# Patient Record
Sex: FEMALE | Race: OTHER | Employment: UNEMPLOYED | URBAN - NONMETROPOLITAN AREA
[De-identification: names, ages, dates, MRNs, and addresses within clinical notes are randomized per-mention and may not be internally consistent; named-entity substitution may affect disease eponyms.]

---

## 2018-10-18 PROBLEM — R10.9 ABDOMINAL CRAMPING: Status: ACTIVE | Noted: 2018-10-18

## 2018-11-05 ENCOUNTER — PREP FOR PROCEDURE (OUTPATIENT)
Dept: OBGYN | Age: 38
End: 2018-11-05

## 2018-11-05 PROBLEM — O32.0XX9: Status: ACTIVE | Noted: 2018-11-05

## 2018-11-05 RX ORDER — ONDANSETRON 2 MG/ML
4 INJECTION INTRAMUSCULAR; INTRAVENOUS EVERY 6 HOURS PRN
Status: CANCELLED | OUTPATIENT
Start: 2018-11-05

## 2018-11-05 RX ORDER — SODIUM CHLORIDE 0.9 % (FLUSH) 0.9 %
10 SYRINGE (ML) INJECTION EVERY 12 HOURS SCHEDULED
Status: CANCELLED | OUTPATIENT
Start: 2018-11-05

## 2018-11-05 RX ORDER — MISOPROSTOL 100 UG/1
900 TABLET ORAL PRN
Status: CANCELLED | OUTPATIENT
Start: 2018-11-05

## 2018-11-05 RX ORDER — SODIUM CHLORIDE 0.9 % (FLUSH) 0.9 %
10 SYRINGE (ML) INJECTION PRN
Status: CANCELLED | OUTPATIENT
Start: 2018-11-05

## 2018-11-05 RX ORDER — LIDOCAINE HYDROCHLORIDE 10 MG/ML
30 INJECTION, SOLUTION EPIDURAL; INFILTRATION; INTRACAUDAL; PERINEURAL PRN
Status: CANCELLED | OUTPATIENT
Start: 2018-11-05

## 2018-11-05 RX ORDER — SEVOFLURANE 250 ML/250ML
1 LIQUID RESPIRATORY (INHALATION) CONTINUOUS PRN
Status: CANCELLED | OUTPATIENT
Start: 2018-11-05

## 2018-11-05 RX ORDER — CARBOPROST TROMETHAMINE 250 UG/ML
250 INJECTION, SOLUTION INTRAMUSCULAR PRN
Status: CANCELLED | OUTPATIENT
Start: 2018-11-05

## 2018-11-05 RX ORDER — ACETAMINOPHEN 325 MG/1
650 TABLET ORAL EVERY 4 HOURS PRN
Status: CANCELLED | OUTPATIENT
Start: 2018-11-05

## 2018-11-05 RX ORDER — NALBUPHINE HYDROCHLORIDE 10 MG/ML
10 INJECTION, SOLUTION INTRAMUSCULAR; INTRAVENOUS; SUBCUTANEOUS
Status: CANCELLED | OUTPATIENT
Start: 2018-11-05

## 2018-11-05 RX ORDER — METHYLERGONOVINE MALEATE 0.2 MG/ML
200 INJECTION INTRAVENOUS PRN
Status: CANCELLED | OUTPATIENT
Start: 2018-11-05

## 2018-11-05 RX ORDER — SODIUM CHLORIDE, SODIUM LACTATE, POTASSIUM CHLORIDE, CALCIUM CHLORIDE 600; 310; 30; 20 MG/100ML; MG/100ML; MG/100ML; MG/100ML
INJECTION, SOLUTION INTRAVENOUS CONTINUOUS
Status: CANCELLED | OUTPATIENT
Start: 2018-11-05

## 2020-08-12 PROBLEM — Z34.90 TERM PREGNANCY: Status: RESOLVED | Noted: 2018-11-05 | Resolved: 2020-08-12

## 2022-01-03 PROBLEM — Z34.90 ENCOUNTER FOR ELECTIVE INDUCTION OF LABOR: Status: ACTIVE | Noted: 2022-01-03

## 2022-01-03 PROBLEM — Z34.90 TERM PREGNANCY: Status: ACTIVE | Noted: 2022-01-03

## 2022-01-04 ENCOUNTER — ROUTINE PRENATAL (OUTPATIENT)
Dept: OBGYN | Age: 42
End: 2022-01-04
Payer: MEDICARE

## 2022-01-04 DIAGNOSIS — Z36.89 DETERMINE FETAL PRESENTATION USING ULTRASOUND: ICD-10-CM

## 2022-01-04 DIAGNOSIS — Z3A.39 39 WEEKS GESTATION OF PREGNANCY: ICD-10-CM

## 2022-01-04 PROCEDURE — 76815 OB US LIMITED FETUS(S): CPT | Performed by: OBSTETRICS & GYNECOLOGY

## 2023-05-01 PROBLEM — Z34.90 ENCOUNTER FOR ELECTIVE INDUCTION OF LABOR: Status: RESOLVED | Noted: 2022-01-03 | Resolved: 2023-05-01

## 2023-05-01 PROBLEM — Z34.90 TERM PREGNANCY: Status: RESOLVED | Noted: 2022-01-03 | Resolved: 2023-05-01

## 2023-05-01 PROBLEM — O40.3XX0 POLYHYDRAMNIOS AFFECTING PREGNANCY IN THIRD TRIMESTER: Status: RESOLVED | Noted: 2018-11-05 | Resolved: 2023-05-01

## 2023-05-01 PROBLEM — O32.0XX9: Status: RESOLVED | Noted: 2018-11-05 | Resolved: 2023-05-01

## 2023-05-01 PROBLEM — R10.9 ABDOMINAL CRAMPING: Status: RESOLVED | Noted: 2018-10-18 | Resolved: 2023-05-01

## 2023-05-01 PROBLEM — Z3A.39 39 WEEKS GESTATION OF PREGNANCY: Status: RESOLVED | Noted: 2022-01-03 | Resolved: 2023-05-01

## 2023-08-10 ENCOUNTER — HOSPITAL ENCOUNTER (OUTPATIENT)
Dept: PHYSICAL THERAPY | Age: 43
Setting detail: THERAPIES SERIES
Discharge: HOME | End: 2023-08-10
Payer: COMMERCIAL

## 2023-08-10 PROCEDURE — 97110 THERAPEUTIC EXERCISES: CPT

## 2023-08-10 PROCEDURE — G0283 ELEC STIM OTHER THAN WOUND: HCPCS

## 2023-08-10 PROCEDURE — 97161 PT EVAL LOW COMPLEX 20 MIN: CPT

## 2023-08-10 ASSESSMENT — PAIN SCALES - QUEBEC BACK PAIN DISABILITY SCALE
RUN ONE BLOCK OR 100M: 4
TAKE FOOD OUT OF THE REFRIGERATOR: 1
PULL OR PUSH HEAVY DOORS: 3
GET OUT OF BED: 1
STAND UP FOR 20 TO 30 MINUTES: 2
MOVE A CHAIR: 2
TURN OVER IN BED: 1
BEND OVER TO CLEAN THE BATHTUB: 3
CARRY TWO BAGS OF GROCERIES: 1
WALK A FEW BLOCKS OR 300 TO 400M: 2
THROW A BALL: 3
REACH UP TO HIGH SHELVES: 2
WALK SEVERAL KILOMETERS  OR MILES: 3
LIFT AND CARRY A HEAVY SUITCASE: 4
RIDE IN A CAR: 3
SIT IN A CHAIR FOR SEVERAL HOURS: 3
PUT ON SOCKS OR PANYHOSE: 1
TOTAL SCORE: 47
MAKE YOUR BED: 2
SLEEP THROUGH THE NIGHT: 4
CLIMB ONE FLIGHT OF STAIRS: 2

## 2023-08-10 NOTE — PROGRESS NOTES
Phone: [address]          Fax: [phone]                      Outpatient Physical Therapy                                                                      Evaluation  Date: 8/10/2023  Patient: Wolf Mosley  : 1980  [facility] #: [id number]    Referring Physician: BONNIE Harrington - *    Medical Diagnosis: Paraspinal muscle spasm, M62.830    Treatment Diagnosis: Low back pain, SIJ dysfunction  Onset Date: 23  PT Insurance Information: [place]  Total # of Visits Approved: 13   Total # of Visits to Date: 1  No Show: 0  Canceled Appointment: 0     Subjective  Subjective: Pt reports onset of back pain in 2023 since multiple pregnancies and prolonged travel and stress. Current pain is 2/10 that is worse when trying to sleep at night. Pain medication helps a little bit but makes her sleepy. 8/10 pain at worst with certain movements with sharp shooting pain and spasms. No xrays taken. Additional Pertinent Hx: hypoglycemia    Observations:   General Observations  Description: Pt sits with lumbar hyperlordosis and anterior pelvic tilt with increased tone/spasm in bilateral paraspinals. (-) B slump, SLR, (+) R JORGE, L innominate anterior rotation corrected with MET in supine.     Objective    Strength       Strength LLE  L Hip Flexion: 4-/5  L Hip ABduction: 4-/5  L Hip ADduction: 4/5  L Knee Flexion: 4-/5  L Knee Extension: 4/5  L Ankle Dorsiflexion: 4/5       Lumbar Assessment     AROM Lumbar Spine   Lumbar spine general AROM: flexion: to floor with pain, B SB: 2in past knees with pain     Special Tests:   Strength RLE  R Hip Flexion: 4-/5  R Hip ABduction: 4-/5  R Hip ADduction: 4/5  R Knee Flexion: 4-/5  R Knee Extension: 4/5  R Ankle Dorsiflexion: 4/5  Strength LLE  L Hip Flexion: 4-/5  L Hip ABduction: 4-/5  L Hip ADduction: 4/5  L Knee Flexion: 4-/5  L Knee Extension: 4/5  L Ankle Dorsiflexion: 4/5      Exercises:  Exercise 1: HEP: PPT, glut

## 2023-08-10 NOTE — PLAN OF CARE
[facility]           Phone: [phone]             Outpatient Physical Therapy  Fax: [phone]                                           Date: 8/10/2023  Patient: Yaneth Epstein : 1980 [facility] #: [id number]   Referring Physician: Berna Frey APRN - *      [x] Plan of Care   [] Updated Plan of Care    Dates of Service to Include: 8/10/2023 to 23    Diagnosis:  Paraspinal muscle spasm, M62.830    Rehab (Treatment) Diagnosis:  Low back pain, SIJ dysfunction             Onset Date:  23    Attendance  Total # of Visits to Date: 1 No Show: 0 Canceled Appointment: 0    Assessment  Assessment: Patient is 44year old female with dx of paraspinal spasms who presents with increased pain 3/54 at worst with certain movements. Pt sits with lumbar hyperlordosis and anterior pelvic tilt with increased tone/spasm in bilateral paraspinals. (-) B slump, SLR, (+) R JORGE, L innominate anterior rotation corrected with MET in supine. Patient with decreased core strength: 4-/5 grossly and decreased B hip strength: 4-/5 all planes. Patient with lumbar ROM WNL's but with increased pain. Patient to benefit from physical therapy to decrease pain and to improve core strength and lumbar stability to return to PLOF. Goals  Short Term Goals  Time Frame for Short Term Goals: 3 weeks  Short Term Goal 1: Patient to initiate HEP for improved core and B hip strength for improved lumbar stability. Short Term Goal 2: Patient to be re-assessed for pelvic alignment and corrected with MET as needed to decrease pain. Short Term Goal 3: Initiate manual techniques/modalities prn to decrease pain and improve mobility. Long Term Goals  Time Frame for Long Term Goals : 6 weeks  Long Term Goal 1: Patient to  be independent and compliant with HEP.   Long Term Goal 2: Patient to have improved core and B hip strength >/=5/5 grossly for

## 2023-08-14 ENCOUNTER — HOSPITAL ENCOUNTER (OUTPATIENT)
Dept: PHYSICAL THERAPY | Age: 43
Setting detail: THERAPIES SERIES
Discharge: HOME | End: 2023-08-14
Payer: COMMERCIAL

## 2023-08-14 PROCEDURE — G0283 ELEC STIM OTHER THAN WOUND: HCPCS

## 2023-08-14 PROCEDURE — 97140 MANUAL THERAPY 1/> REGIONS: CPT

## 2023-08-14 PROCEDURE — 97110 THERAPEUTIC EXERCISES: CPT

## 2023-08-14 NOTE — PROGRESS NOTES
Phone: [address] [facility]           Fax: [phone]                           Outpatient Physical Therapy                                                                            Daily Note    Patient: Shreyas Park : 1980  [facility] #: [id number]   Referring Physician: BONNIE Mckeon - *  Date: 2023     Treatment Diagnosis: Low back pain, SIJ dysfunction    Onset Date: 23  PT Insurance Information: [place]  Total # of Visits Approved: 13 Per Physician Order  Total # of Visits to Date: 2  No Show: 0  Canceled Appointment: 0    23 Plan of Care/Recert Due    Pre-Treatment Pain:  2/10  Subjective: Pt states she has still been pretty tight and shes having some soreness. Pt rates current pain a /10. Exercises:  Exercise 1: HEP: PPT, glut sets, LTR, DKTC  Exercise 2: SKTC 3x30  Exercise 3: PPT/ PPT march/  15x ea  Exercise 4: supine clams 15x (GTB)    Manual:  Muscle Energy: MET to correct L innominate anterior rotation  Soft Tissue Mobilizaton: Thermoprobe hot 8 mins (supine) to lumbar spine    Modalities:   IFC with HP 15 mins to LB for discomfort. Assessment  Assessment: Initiated light core stability program today with fair tolerance noted. Pt displayed weak core with difficulty maintaining pelvic stability during exercises. Thermoprobe to lumbar paraspinals for discomfort. Will continue. Activity Tolerance  Activity Tolerance: Patient tolerated treatment well, Patient limited by pain    Patient Education  Patient Education: Exercise rationale. Pt verbalized/demonstrated good understanding:     [x] Yes         [] No, pt required further clarification.      Post Treatment Pain:  2/10    Plan  Plan Frequency: 2  Plan weeks: 6     Goals  (Total # of Visits to Date: 2)      Short Term Goals  Time Frame for Short Term Goals: 3 weeks  Short Term Goal 1: Patient to initiate HEP for improved core and B hip strength for improved lumbar

## 2023-08-16 ENCOUNTER — HOSPITAL ENCOUNTER (OUTPATIENT)
Dept: PHYSICAL THERAPY | Age: 43
Setting detail: THERAPIES SERIES
Discharge: HOME | End: 2023-08-16
Payer: COMMERCIAL

## 2023-08-16 PROCEDURE — 97140 MANUAL THERAPY 1/> REGIONS: CPT

## 2023-08-16 PROCEDURE — 97110 THERAPEUTIC EXERCISES: CPT

## 2023-08-16 NOTE — PROGRESS NOTES
Phone: [address]           Fax: [phone]                           Outpatient Physical Therapy                                                                            Daily Note    Patient: Arianne Neff : 1980  [facility] #: [id number]   Referring Physician: Phil  - *  Date: 2023       Treatment Diagnosis: Low back pain, SIJ dysfunction    Onset Date: 23  PT Insurance Information: [place]  Total # of Visits Approved: 13 Per Physician Order  Total # of Visits to Date: 3  No Show: 0  Canceled Appointment: 0    23 Plan of Care/Recert Due    Pre-Treatment Pain:  1/10  Subjective: pt reports being sore after therapy for a day then it calms down, pt feels therapy is helping reduce pain and tightness. Exercises:  Exercise 1: HEP: PPT, glut sets, LTR, DKTC  Exercise 2: SKTC 3x30  Exercise 3: PPT/ PPT march/  15x ea  Exercise 4: supine clams 15x (GTB) ; hip add with ball 15x  Exercise 5: Scifit 10min level 2  Exercise 6: standing sink ex x20    Manual:  Muscle Energy: MET to correct L innominate anterior rotation  Soft Tissue Mobilizaton: Thermoprobe hot 8 mins (supine) to lumbar spine    Assessment  Assessment: progressed pt with core and hip strengthening with fair tolerance. pt reported feeling \"out of shape\" after each exercise. thermaprobe provided to reduce muscle tone and discomfort. continue to progress per tolerance. PTA encouraged pt to continue HEP to aid in return to PLOF. Activity Tolerance  Activity Tolerance: Patient tolerated treatment well    Patient Education  Patient Education: Exercise rationale. Pt verbalized/demonstrated good understanding:     [x] Yes         [] No, pt required further clarification.        Post Treatment Pain:  1/10      Plan  Plan Frequency: 2  Plan weeks: 6       Goals  (Total # of Visits to Date: 3)      Short Term Goals  Time Frame for Short Term Goals: 3 weeks  Short Term

## 2023-08-21 ENCOUNTER — HOSPITAL ENCOUNTER (OUTPATIENT)
Dept: PHYSICAL THERAPY | Age: 43
Setting detail: THERAPIES SERIES
Discharge: HOME | End: 2023-08-21
Payer: COMMERCIAL

## 2023-08-21 PROCEDURE — G0283 ELEC STIM OTHER THAN WOUND: HCPCS

## 2023-08-21 PROCEDURE — 97140 MANUAL THERAPY 1/> REGIONS: CPT

## 2023-08-21 PROCEDURE — 97110 THERAPEUTIC EXERCISES: CPT

## 2023-08-21 NOTE — PROGRESS NOTES
Short Term Goals  Time Frame for Short Term Goals: 3 weeks  Short Term Goal 1: Patient to initiate HEP for improved core and B hip strength for improved lumbar stability. -MET  Short Term Goal 2: Patient to be re-assessed for pelvic alignment and corrected with MET as needed to decrease pain. -MET/ Continued  Short Term Goal 3: Initiate manual techniques/modalities prn to decrease pain and improve mobility. -MET    Long Term Goals  Time Frame for Long Term Goals : 6 weeks  Long Term Goal 1: Patient to  be independent and compliant with HEP. Long Term Goal 2: Patient to have improved core and B hip strength >/=5/5 grossly for improved lumbar stability. Long Term Goal 3: Patient to have improved lumbar AROM flexion to floor and B SB to knees with no increase in pain for improved mobility. Long Term Goal 4: Patient to report >/=75% improvement in symptoms for improved QOL.   Minutes Tracking:  Time In: 0932  Time Out: 1030  Minutes: 58  Timed Code Treatment Minutes: [place], [place]     Date: 8/21/2023

## 2023-08-23 ENCOUNTER — HOSPITAL ENCOUNTER (OUTPATIENT)
Dept: PHYSICAL THERAPY | Age: 43
Setting detail: THERAPIES SERIES
Discharge: HOME | End: 2023-08-23
Payer: COMMERCIAL

## 2023-08-23 ENCOUNTER — APPOINTMENT (OUTPATIENT)
Dept: PHYSICAL THERAPY | Age: 43
End: 2023-08-23
Payer: COMMERCIAL

## 2023-08-23 PROCEDURE — 97110 THERAPEUTIC EXERCISES: CPT

## 2023-08-23 PROCEDURE — G0283 ELEC STIM OTHER THAN WOUND: HCPCS

## 2023-08-23 NOTE — PROGRESS NOTES
Phone: [facility]           Fax: [phone]                           Outpatient Physical Therapy                                                                            Daily Note    Patient: Sisi Luna : 1980  CSN #: [id number]   Referring Physician: BONNIE Maria - *  Date: 2023       Treatment Diagnosis: Low back pain, SIJ dysfunction    Onset Date: 23  PT Insurance Information: [place]  Total # of Visits Approved: 13 Per Physician Order  Total # of Visits to Date: 5  No Show: 0  Canceled Appointment: 0    23 Plan of Care/Recert Due    Pre-Treatment Pain:  1/10  Subjective: pt reports feeling tired today with pain 1/10. pt states pain is worse by end of day but more aware of postural awareness to prevent pain. Exercises:  Exercise 1: HEP: PPT, glut sets, LTR, DKTC  Exercise 2: SKTC 3x30  Exercise 3: PPT/ PPT march/ bridges/ ball between knee leg lifts x15  Exercise 4: supine clams 15x (GTB) ; hip add with ball 15x; SLR x10  Exercise 7: ball rollouts fwd/lat 5x 10sec hold  Exercise 8: ab iso 10x 10 sec (ball on knees in sitting)  Exercise 9: sit<>stands 10x2 5#  Exercise 10: Physioball: prone hip ext 10x ea and seated march/ crunch 15x ea  Exercise 11: Physioball a/p lat rotations 30 seconds ea  Exercise 12: upright bike 5min  Exercise 13: hip abduction with OTB 4 laps at counter  Exercise 14: 12\" hurdles x 10    Modalities:   IFC and MHP to reduce muscle tone and discomfort. Assessment  Assessment: progressed pt with core strengthening exercises with good tolerance. pt demos good core stability this date with slow and controlled movements. cues to continue breathing as pt holds breath when dameon core muscles. IFC and MHP applied to lumber region to reduce muscle tone and discomfort.     Activity Tolerance  Activity Tolerance: Patient tolerated treatment well    Patient Education  Patient Education: Exercise

## 2023-08-28 ENCOUNTER — HOSPITAL ENCOUNTER (OUTPATIENT)
Dept: PHYSICAL THERAPY | Age: 43
Setting detail: THERAPIES SERIES
Discharge: HOME | End: 2023-08-28
Payer: COMMERCIAL

## 2023-08-28 PROCEDURE — G0283 ELEC STIM OTHER THAN WOUND: HCPCS

## 2023-08-28 PROCEDURE — 97110 THERAPEUTIC EXERCISES: CPT

## 2023-08-28 NOTE — PROGRESS NOTES
Phone: [address] [facility]           Fax: [phone]                           Outpatient Physical Therapy                                                                            Daily Note    Patient: Yoli Cameron : 1980  CSN #: [id number]   Referring Physician: BONNIE Ramírez - *  Date: 2023     Treatment Diagnosis: Low back pain, SIJ dysfunction    Onset Date: 23  PT Insurance Information: [place]  Total # of Visits Approved: 13 Per Physician Order  Total # of Visits to Date: 6  No Show: 0  Canceled Appointment: 0    23 Plan of Care/Recert Due    Pre-Treatment Pain:  1/10  Subjective: Pt states she has been paying more attention to posture throughout and she feels like her back is getting better. Pt rates current pain a 1/10. Exercises:  Exercise 1: HEP: PPT, glut sets, LTR, DKTC  Exercise 4: supine clams 15x (GTB) ; hip add with ball 15x; SLR x10  Exercise 7: ball rollouts fwd/lat 5x 10sec hold  Exercise 8: ab iso 10x 10 sec (ball on knees in sitting)  Exercise 9: sit<>stands 10x2 5#  Exercise 10: Physioball: prone hip ext 10x ea and seated march/ crunch 15x ea  Exercise 11: Physioball a/p lat rotations 30 seconds ea  Exercise 13: hip abduction with OTB 4 laps at counter  Exercise 14: 12\" hurdles x 10  Exercise 15: kneeling plank 3x30    Modalities:   IFC with HP 15 mins for discomfort. Assessment  Assessment: Pt unable to maintain supine DLL today with appropriate core contraction. Advanced core stability program today with fair tolerance noted. Activity Tolerance  Activity Tolerance: Patient tolerated treatment well    Patient Education  Patient Education: Exercise rationale. Pt verbalized/demonstrated good understanding:     [x] Yes         [] No, pt required further clarification.      Post Treatment Pain:  1/10    Plan  Plan Frequency: 2  Plan weeks: 6     Goals  (Total # of Visits to Date: 6)      Short Term

## 2023-08-30 ENCOUNTER — APPOINTMENT (OUTPATIENT)
Dept: PHYSICAL THERAPY | Age: 43
End: 2023-08-30
Payer: COMMERCIAL

## 2023-08-30 ENCOUNTER — HOSPITAL ENCOUNTER (OUTPATIENT)
Dept: PHYSICAL THERAPY | Age: 43
Setting detail: THERAPIES SERIES
Discharge: HOME | End: 2023-08-30
Payer: COMMERCIAL

## 2023-08-30 PROCEDURE — G0283 ELEC STIM OTHER THAN WOUND: HCPCS

## 2023-08-30 PROCEDURE — 97110 THERAPEUTIC EXERCISES: CPT

## 2023-08-30 NOTE — PROGRESS NOTES
Phone: [address] [facility]           Fax: [phone]                           Outpatient Physical Therapy                                                                            Daily Note    Patient: Tiffanie Joseph : 1980  [facility] #: [id number]   Referring Physician: BONNIE Wadsworth - *  Date: 2023     Treatment Diagnosis: Low back pain, SIJ dysfunction    Onset Date: 23  PT Insurance Information: [place]  Total # of Visits Approved: 13 Per Physician Order  Total # of Visits to Date: 7  No Show: 0  Canceled Appointment: 0    23 Plan of Care/Recert Due    Pre-Treatment Pain:  1/10  Subjective: Pt reports her back is feeling better. Pt states she has noticed improvements since starting PT. Pt rates current pain a 1/10. Exercises:  Exercise 3: PPT/ PPT march/ bridges/ ball between knee leg lifts x15  Exercise 4: supine clams 15x (GTB) ; hip add with ball 15x; SLR x10  Exercise 7: ball rollouts fwd/lat 5x 10sec hold  Exercise 9: sit<>stands 10x2 5#  Exercise 10: Physioball: prone hip ext 10x ea and seated march/ crunch 15x ea  Exercise 11: Physioball a/p lat rotations 30 seconds ea  Exercise 13: hip abduction with GTB 4 laps at counter  Exercise 14: 12\" hurdles x 10  Exercise 15: kneeling plank 3x30    Modalities:   IFC with HP 15 mins for discomfort. Assessment  Assessment: Lumbar ROM progressing with ability to forward flex, ext and sidebend within functional range with no limitations. Pt slade will be traveling for the next two weeks so she requested a hold to continue with HEP then continue after d/t childcare. Pt HEP progressed with core stability exercises to continue at home. Will continue. Activity Tolerance  Activity Tolerance: Patient tolerated treatment well    Patient Education  Patient Education: Progressed HEP (See scan in).   Pt verbalized/demonstrated good understanding:     [x] Yes         [] No, pt required

## 2023-09-05 ENCOUNTER — APPOINTMENT (OUTPATIENT)
Dept: PHYSICAL THERAPY | Age: 43
End: 2023-09-05
Payer: COMMERCIAL

## 2023-09-06 ENCOUNTER — APPOINTMENT (OUTPATIENT)
Dept: PHYSICAL THERAPY | Age: 43
End: 2023-09-06
Payer: COMMERCIAL

## 2023-09-07 ENCOUNTER — APPOINTMENT (OUTPATIENT)
Dept: PHYSICAL THERAPY | Age: 43
End: 2023-09-07
Payer: COMMERCIAL

## 2023-09-08 ENCOUNTER — APPOINTMENT (OUTPATIENT)
Dept: PHYSICAL THERAPY | Age: 43
End: 2023-09-08
Payer: COMMERCIAL

## 2023-09-11 ENCOUNTER — APPOINTMENT (OUTPATIENT)
Dept: PHYSICAL THERAPY | Age: 43
End: 2023-09-11
Payer: COMMERCIAL

## 2023-09-13 ENCOUNTER — APPOINTMENT (OUTPATIENT)
Dept: PHYSICAL THERAPY | Age: 43
End: 2023-09-13
Payer: COMMERCIAL

## 2023-09-18 ENCOUNTER — APPOINTMENT (OUTPATIENT)
Dept: PHYSICAL THERAPY | Age: 43
End: 2023-09-18
Payer: COMMERCIAL

## 2023-09-20 ENCOUNTER — APPOINTMENT (OUTPATIENT)
Dept: PHYSICAL THERAPY | Age: 43
End: 2023-09-20
Payer: COMMERCIAL

## 2023-09-20 ENCOUNTER — HOSPITAL ENCOUNTER (OUTPATIENT)
Dept: PHYSICAL THERAPY | Age: 43
Setting detail: THERAPIES SERIES
Discharge: HOME | End: 2023-09-20
Payer: COMMERCIAL

## 2023-09-20 PROCEDURE — 97110 THERAPEUTIC EXERCISES: CPT

## 2023-09-20 NOTE — PROGRESS NOTES
2  Plan weeks: 6     Goals  (Total # of Visits to Date: 8)      Short Term Goals  Time Frame for Short Term Goals: 3 weeks  Short Term Goal 1: Patient to initiate HEP for improved core and B hip strength for improved lumbar stability. -MET  Short Term Goal 2: Patient to be re-assessed for pelvic alignment and corrected with MET as needed to decrease pain. -MET/ Continued  Short Term Goal 3: Initiate manual techniques/modalities prn to decrease pain and improve mobility. -MET    Long Term Goals  Time Frame for Long Term Goals : 6 weeks  Long Term Goal 1: Patient to  be independent and compliant with HEP. Long Term Goal 2: Patient to have improved core and B hip strength >/=5/5 grossly for improved lumbar stability. Long Term Goal 3: Patient to have improved lumbar AROM flexion to floor and B SB to knees with no increase in pain for improved mobility. Long Term Goal 4: Patient to report >/=75% improvement in symptoms for improved QOL.     Minutes Tracking:  Time In: 9759  Time Out: 1015  Minutes: 31  Timed Code Treatment Minutes: [address]     Date: 9/20/2023

## 2023-11-28 ENCOUNTER — OFFICE VISIT (OUTPATIENT)
Dept: PRIMARY CARE CLINIC | Age: 43
End: 2023-11-28
Payer: COMMERCIAL

## 2023-11-28 VITALS
SYSTOLIC BLOOD PRESSURE: 114 MMHG | BODY MASS INDEX: 23.42 KG/M2 | OXYGEN SATURATION: 98 % | DIASTOLIC BLOOD PRESSURE: 68 MMHG | WEIGHT: 132.2 LBS | RESPIRATION RATE: 20 BRPM | HEART RATE: 78 BPM | TEMPERATURE: 98.5 F

## 2023-11-28 DIAGNOSIS — J20.9 ACUTE BRONCHITIS, UNSPECIFIED ORGANISM: ICD-10-CM

## 2023-11-28 DIAGNOSIS — J01.40 ACUTE NON-RECURRENT PANSINUSITIS: Primary | ICD-10-CM

## 2023-11-28 PROCEDURE — 99214 OFFICE O/P EST MOD 30 MIN: CPT | Performed by: NURSE PRACTITIONER

## 2023-11-28 PROCEDURE — 1036F TOBACCO NON-USER: CPT | Performed by: NURSE PRACTITIONER

## 2023-11-28 PROCEDURE — G8420 CALC BMI NORM PARAMETERS: HCPCS | Performed by: NURSE PRACTITIONER

## 2023-11-28 PROCEDURE — G8427 DOCREV CUR MEDS BY ELIG CLIN: HCPCS | Performed by: NURSE PRACTITIONER

## 2023-11-28 PROCEDURE — G8484 FLU IMMUNIZE NO ADMIN: HCPCS | Performed by: NURSE PRACTITIONER

## 2023-11-28 RX ORDER — PREDNISONE 20 MG/1
40 TABLET ORAL DAILY
Qty: 10 TABLET | Refills: 0 | Status: SHIPPED | OUTPATIENT
Start: 2023-11-28 | End: 2023-12-03

## 2023-11-28 RX ORDER — BENZONATATE 200 MG/1
200 CAPSULE ORAL 3 TIMES DAILY PRN
Qty: 30 CAPSULE | Refills: 0 | Status: SHIPPED | OUTPATIENT
Start: 2023-11-28

## 2023-11-28 RX ORDER — AMOXICILLIN AND CLAVULANATE POTASSIUM 875; 125 MG/1; MG/1
1 TABLET, FILM COATED ORAL 2 TIMES DAILY
Qty: 20 TABLET | Refills: 0 | Status: SHIPPED | OUTPATIENT
Start: 2023-11-28 | End: 2023-12-08

## 2023-11-28 SDOH — ECONOMIC STABILITY: FOOD INSECURITY: WITHIN THE PAST 12 MONTHS, THE FOOD YOU BOUGHT JUST DIDN'T LAST AND YOU DIDN'T HAVE MONEY TO GET MORE.: PATIENT DECLINED

## 2023-11-28 SDOH — ECONOMIC STABILITY: INCOME INSECURITY: HOW HARD IS IT FOR YOU TO PAY FOR THE VERY BASICS LIKE FOOD, HOUSING, MEDICAL CARE, AND HEATING?: PATIENT DECLINED

## 2023-11-28 SDOH — ECONOMIC STABILITY: FOOD INSECURITY: WITHIN THE PAST 12 MONTHS, YOU WORRIED THAT YOUR FOOD WOULD RUN OUT BEFORE YOU GOT MONEY TO BUY MORE.: PATIENT DECLINED

## 2023-11-28 SDOH — ECONOMIC STABILITY: HOUSING INSECURITY
IN THE LAST 12 MONTHS, WAS THERE A TIME WHEN YOU DID NOT HAVE A STEADY PLACE TO SLEEP OR SLEPT IN A SHELTER (INCLUDING NOW)?: PATIENT REFUSED

## 2023-11-28 ASSESSMENT — ENCOUNTER SYMPTOMS
SWOLLEN GLANDS: 0
SORE THROAT: 1
TROUBLE SWALLOWING: 0
SINUS PRESSURE: 1
NAUSEA: 0
ABDOMINAL PAIN: 0
WHEEZING: 0
SINUS PAIN: 1
DIARRHEA: 0
COUGH: 1
SHORTNESS OF BREATH: 0
VOMITING: 0
RHINORRHEA: 1
HEMOPTYSIS: 0
HEARTBURN: 0

## 2023-11-28 ASSESSMENT — PATIENT HEALTH QUESTIONNAIRE - PHQ9
2. FEELING DOWN, DEPRESSED OR HOPELESS: 0
SUM OF ALL RESPONSES TO PHQ QUESTIONS 1-9: 0
SUM OF ALL RESPONSES TO PHQ QUESTIONS 1-9: 0
1. LITTLE INTEREST OR PLEASURE IN DOING THINGS: 0
SUM OF ALL RESPONSES TO PHQ9 QUESTIONS 1 & 2: 0
SUM OF ALL RESPONSES TO PHQ QUESTIONS 1-9: 0
SUM OF ALL RESPONSES TO PHQ QUESTIONS 1-9: 0

## 2023-11-28 NOTE — PATIENT INSTRUCTIONS
SURVEY:     You may be receiving a survey from [employer] regarding your visit today. Please complete the survey to enable us to provide the highest quality of care to you and your family. If you cannot score us a very good on any question, please call the office to discuss how we could have made your experience a very good one.      Thank you,    Eva Frey, APRN-LOAN Lopez, APRN-CNP  Adam Hoffman, FRED Moreno, TINO Osullivan, TINO Lopes, CMA  Marianna, TAVON Dickens, PM

## 2023-11-28 NOTE — PROGRESS NOTES
Name: Isabelle Padron  : 1980         Chief Complaint:     Chief Complaint   Patient presents with    Sinus Problem     X 1 month, no fever. Cough     X 1 month. History of Present Illness:      Jaymie Miranda is a 44 y.o.  female who presents with Sinus Problem (X 1 month, no fever.) and Cough (X 1 month.)      Jaymie is here today for a same day office visit. URI   This is a recurrent problem. The current episode started 1 to 4 weeks ago. The problem has been waxing and waning. Maximum temperature: SUBJECTIVE. The fever has been present for 1 to 2 days. Associated symptoms include congestion, coughing, headaches, a plugged ear sensation, rhinorrhea, sinus pain, sneezing and a sore throat. Pertinent negatives include no abdominal pain, chest pain, diarrhea, dysuria, ear pain, joint pain, joint swelling, nausea, neck pain, rash, swollen glands, vomiting or wheezing. She has tried decongestant for the symptoms. The treatment provided mild relief. Cough  This is a recurrent problem. The current episode started 1 to 4 weeks ago. The problem has been waxing and waning. Episode frequency: INTERMITTENTLY. The cough is Productive of sputum. Associated symptoms include chills, ear congestion, a fever, headaches, nasal congestion, postnasal drip, rhinorrhea and a sore throat. Pertinent negatives include no chest pain, ear pain, heartburn, hemoptysis, myalgias, rash, shortness of breath, sweats, weight loss or wheezing. The symptoms are aggravated by lying down. She has tried OTC cough suppressant and a beta-agonist inhaler for the symptoms. The treatment provided mild relief. Her past medical history is significant for bronchitis and environmental allergies. There is no history of asthma, bronchiectasis, COPD, emphysema or pneumonia.          Past Medical History:     Past Medical History:   Diagnosis Date    Abnormal Pap smear of cervix     ASCUS - repeat pap normal    Anemia

## 2023-11-30 ENCOUNTER — OFFICE VISIT (OUTPATIENT)
Dept: UROLOGY | Age: 43
End: 2023-11-30
Payer: COMMERCIAL

## 2023-11-30 VITALS
HEART RATE: 79 BPM | WEIGHT: 133 LBS | DIASTOLIC BLOOD PRESSURE: 74 MMHG | SYSTOLIC BLOOD PRESSURE: 109 MMHG | BODY MASS INDEX: 23.56 KG/M2 | TEMPERATURE: 97.4 F

## 2023-11-30 DIAGNOSIS — R39.15 URGENCY OF URINATION: ICD-10-CM

## 2023-11-30 DIAGNOSIS — N39.46 MIXED INCONTINENCE: Primary | ICD-10-CM

## 2023-11-30 PROCEDURE — G8427 DOCREV CUR MEDS BY ELIG CLIN: HCPCS | Performed by: UROLOGY

## 2023-11-30 PROCEDURE — G8420 CALC BMI NORM PARAMETERS: HCPCS | Performed by: UROLOGY

## 2023-11-30 PROCEDURE — 1036F TOBACCO NON-USER: CPT | Performed by: UROLOGY

## 2023-11-30 PROCEDURE — G8484 FLU IMMUNIZE NO ADMIN: HCPCS | Performed by: UROLOGY

## 2023-11-30 PROCEDURE — 99213 OFFICE O/P EST LOW 20 MIN: CPT | Performed by: UROLOGY

## 2023-11-30 ASSESSMENT — ENCOUNTER SYMPTOMS
EYE REDNESS: 0
BACK PAIN: 0
ABDOMINAL PAIN: 0
WHEEZING: 0
CONSTIPATION: 0
COUGH: 0
NAUSEA: 0
VOMITING: 0
SHORTNESS OF BREATH: 0
APNEA: 0
COLOR CHANGE: 0

## 2023-11-30 NOTE — PATIENT INSTRUCTIONS
Survey: You may be receiving a survey from [employer] regarding your visit today. Please complete the survey to enable us to provide the highest quality of care to you and your family.     If you cannot score us as very good on any question, please call the office to discuss how we could have major experience exceptional.    Thank you    Your Urology Care Team.

## 2023-12-11 ENCOUNTER — OFFICE VISIT (OUTPATIENT)
Dept: PRIMARY CARE CLINIC | Age: 43
End: 2023-12-11
Payer: COMMERCIAL

## 2023-12-11 VITALS
DIASTOLIC BLOOD PRESSURE: 70 MMHG | HEART RATE: 88 BPM | SYSTOLIC BLOOD PRESSURE: 106 MMHG | OXYGEN SATURATION: 97 % | RESPIRATION RATE: 16 BRPM | TEMPERATURE: 98 F | BODY MASS INDEX: 23.63 KG/M2 | WEIGHT: 133.4 LBS

## 2023-12-11 DIAGNOSIS — J20.9 BRONCHITIS WITH ASTHMA, ACUTE: Primary | ICD-10-CM

## 2023-12-11 DIAGNOSIS — J45.909 BRONCHITIS WITH ASTHMA, ACUTE: Primary | ICD-10-CM

## 2023-12-11 PROCEDURE — G8420 CALC BMI NORM PARAMETERS: HCPCS | Performed by: NURSE PRACTITIONER

## 2023-12-11 PROCEDURE — G8484 FLU IMMUNIZE NO ADMIN: HCPCS | Performed by: NURSE PRACTITIONER

## 2023-12-11 PROCEDURE — G8427 DOCREV CUR MEDS BY ELIG CLIN: HCPCS | Performed by: NURSE PRACTITIONER

## 2023-12-11 PROCEDURE — 1036F TOBACCO NON-USER: CPT | Performed by: NURSE PRACTITIONER

## 2023-12-11 PROCEDURE — 99214 OFFICE O/P EST MOD 30 MIN: CPT | Performed by: NURSE PRACTITIONER

## 2023-12-11 RX ORDER — DOXYCYCLINE HYCLATE 100 MG
100 TABLET ORAL 2 TIMES DAILY
Qty: 20 TABLET | Refills: 0 | Status: SHIPPED | OUTPATIENT
Start: 2023-12-11 | End: 2023-12-21

## 2023-12-11 RX ORDER — DEXTROMETHORPHAN HYDROBROMIDE, GUAIFENESIN AND PSEUDOEPHEDRINE HYDROCHLORIDE 15; 400; 60 MG/1; MG/1; MG/1
1 TABLET ORAL EVERY 6 HOURS PRN
Qty: 28 TABLET | Refills: 0 | Status: SHIPPED | OUTPATIENT
Start: 2023-12-11 | End: 2023-12-18

## 2023-12-11 ASSESSMENT — PATIENT HEALTH QUESTIONNAIRE - PHQ9
SUM OF ALL RESPONSES TO PHQ QUESTIONS 1-9: 0
SUM OF ALL RESPONSES TO PHQ QUESTIONS 1-9: 0
2. FEELING DOWN, DEPRESSED OR HOPELESS: 0
SUM OF ALL RESPONSES TO PHQ QUESTIONS 1-9: 0
SUM OF ALL RESPONSES TO PHQ9 QUESTIONS 1 & 2: 0
1. LITTLE INTEREST OR PLEASURE IN DOING THINGS: 0
SUM OF ALL RESPONSES TO PHQ QUESTIONS 1-9: 0

## 2023-12-11 ASSESSMENT — ENCOUNTER SYMPTOMS
WHEEZING: 1
ALLERGIC/IMMUNOLOGIC NEGATIVE: 1
GASTROINTESTINAL NEGATIVE: 1
SORE THROAT: 0
COUGH: 1
EYES NEGATIVE: 1
RHINORRHEA: 1

## 2023-12-11 NOTE — PATIENT INSTRUCTIONS
SURVEY:     You may be receiving a survey from [employer] regarding your visit today. Please complete the survey to enable us to provide the highest quality of care to you and your family. If you cannot score us a very good on any question, please call the office to discuss how we could have made your experience a very good one.      Thank you,    Georgina Frey, APRN-CNP  Abraham Lara, APRN-CNP  Mac Mathur, FRED Worrell, TINO Rosenthal, TINO Lopes, CMA  Marianna, PCA  Chrissie, PM
- - -

## 2023-12-11 NOTE — PROGRESS NOTES
healthy behaviors and Education discussed during visit. 2.  Patient given educational materials - see patient instructions  3. Patient was provided AVS.  4.  Discussed use, benefit, and side effects of prescribed medications. Barriers to medication compliance addressed. All patient questions answered. Pt voiced understanding. 5.  Reviewed prior labs and health maintenance  6. Continue current medications, diet and exercise. Completed Refills   Requested Prescriptions     Signed Prescriptions Disp Refills    Pseudoephedrine-DM-GG (CAPMIST DM) 60- MG TABS 28 tablet 0     Sig: Take 1 tablet by mouth every 6 hours as needed (Sinus pressure)    doxycycline hyclate (VIBRA-TABS) 100 MG tablet 20 tablet 0     Sig: Take 1 tablet by mouth 2 times daily for 10 days         No follow-ups on file.

## 2024-01-15 ENCOUNTER — HOSPITAL ENCOUNTER (OUTPATIENT)
Age: 44
Setting detail: SPECIMEN
Discharge: HOME | End: 2024-01-15
Payer: COMMERCIAL

## 2024-01-15 ENCOUNTER — OFFICE VISIT (OUTPATIENT)
Dept: OBGYN | Age: 44
End: 2024-01-15
Payer: COMMERCIAL

## 2024-01-15 VITALS
HEIGHT: 63 IN | DIASTOLIC BLOOD PRESSURE: 62 MMHG | BODY MASS INDEX: 23.39 KG/M2 | SYSTOLIC BLOOD PRESSURE: 102 MMHG | WEIGHT: 132 LBS

## 2024-01-15 DIAGNOSIS — N97.0 ANOVULATORY CYCLE: Primary | ICD-10-CM

## 2024-01-15 DIAGNOSIS — Z31.69 ENCOUNTER FOR PRECONCEPTION CONSULTATION: ICD-10-CM

## 2024-01-15 DIAGNOSIS — N97.0 ANOVULATORY CYCLE: ICD-10-CM

## 2024-01-15 PROCEDURE — 1036F TOBACCO NON-USER: CPT | Performed by: ADVANCED PRACTICE MIDWIFE

## 2024-01-15 PROCEDURE — G8484 FLU IMMUNIZE NO ADMIN: HCPCS | Performed by: ADVANCED PRACTICE MIDWIFE

## 2024-01-15 PROCEDURE — 36415 COLL VENOUS BLD VENIPUNCTURE: CPT | Performed by: ADVANCED PRACTICE MIDWIFE

## 2024-01-15 PROCEDURE — G8420 CALC BMI NORM PARAMETERS: HCPCS | Performed by: ADVANCED PRACTICE MIDWIFE

## 2024-01-15 PROCEDURE — 84144 ASSAY OF PROGESTERONE: CPT

## 2024-01-15 PROCEDURE — 82166 ASSAY ANTI-MULLERIAN HORM: CPT

## 2024-01-15 PROCEDURE — 99213 OFFICE O/P EST LOW 20 MIN: CPT | Performed by: ADVANCED PRACTICE MIDWIFE

## 2024-01-15 PROCEDURE — G8427 DOCREV CUR MEDS BY ELIG CLIN: HCPCS | Performed by: ADVANCED PRACTICE MIDWIFE

## 2024-01-15 ASSESSMENT — PATIENT HEALTH QUESTIONNAIRE - PHQ9
SUM OF ALL RESPONSES TO PHQ9 QUESTIONS 1 & 2: 0
SUM OF ALL RESPONSES TO PHQ QUESTIONS 1-9: 0
2. FEELING DOWN, DEPRESSED OR HOPELESS: 0
SUM OF ALL RESPONSES TO PHQ QUESTIONS 1-9: 0
SUM OF ALL RESPONSES TO PHQ QUESTIONS 1-9: 0
1. LITTLE INTEREST OR PLEASURE IN DOING THINGS: 0
SUM OF ALL RESPONSES TO PHQ QUESTIONS 1-9: 0

## 2024-01-15 NOTE — PROGRESS NOTES
PROBLEM VISIT     Date of service: 1/15/2024    Jaymie Miranda  Is a 43 y.o. single, female    PT's PCP is: Stevan Frey, BONNIE - CNP     : 1980                                             Subjective:       Patient's last menstrual period was 2023 (exact date).     OB History    Para Term  AB Living   6 4 4   2 4   SAB IAB Ectopic Molar Multiple Live Births   2       0 4      # Outcome Date GA Lbr Edward/2nd Weight Sex Delivery Anes PTL Lv   6 Term 22 39w1d 03:21 / 00:06 3.566 kg (7 lb 13.8 oz) F Vag-Spont EPI N RUSSELL   5 SAB 2020 8w0d          4 SAB 20 6w0d          3 Term 18 40w3d 05::20 4.465 kg (9 lb 13.5 oz) M Vag-Spont EPI N RUSSELL   2 Term 11 40w0d  3.827 kg (8 lb 7 oz) F Vag-Spont None  RSUSELL   1 Term 02/01/10 41w0d  3.884 kg (8 lb 9 oz) F Vag-Spont None  RUSSELL        Social History     Tobacco Use   Smoking Status Never   Smokeless Tobacco Never        Social History     Substance and Sexual Activity   Alcohol Use No       Social History     Substance and Sexual Activity   Sexual Activity Not Currently    Partners: Male    Birth control/protection: Coitus interruptus       Allergies: Patient has no known allergies.    Chief Complaint   Patient presents with    Other     Pt desires pregnancy, and looked into sperm clinics but the one in [place] costs too much for them to inseminate her. So she would like information on other clinics that are cheaper because she can't pay more than 400.00       Last Yearly date:      Last pap date and results: 23    Last HPV date and results: 20 neg     Have you had a positive covid test: Yes    Have you had the covid immunization: No    PE:  Vital Signs  Blood pressure 102/62, height 1.6 m (5' 3\"), weight 59.9 kg (132 lb), last menstrual period 2023, not currently breastfeeding.  Estimated body mass index is 23.38 kg/m² as calculated from the following:    Height as of this encounter: 1.6 m

## 2024-01-16 LAB — PROGEST SERPL-MCNC: 27.5 NG/ML

## 2024-01-17 LAB — ANTI-MULLERIAN HORMONE: 1.18 NG/ML

## 2024-01-23 ENCOUNTER — TELEPHONE (OUTPATIENT)
Dept: PRIMARY CARE CLINIC | Age: 44
End: 2024-01-23

## 2024-01-23 DIAGNOSIS — Z20.828 EXPOSURE TO THE FLU: Primary | ICD-10-CM

## 2024-01-23 RX ORDER — OSELTAMIVIR PHOSPHATE 75 MG/1
75 CAPSULE ORAL DAILY
Qty: 7 CAPSULE | Refills: 0 | Status: SHIPPED | OUTPATIENT
Start: 2024-01-23 | End: 2024-01-30

## 2024-01-23 NOTE — TELEPHONE ENCOUNTER
Jaymie came to office to request medication for the flu (Tamiflu?)    Her children have been diagnosed with the flu and the pediatrician recommended she get a prescription from her PCP.    Will you prescribe?

## 2024-02-02 ENCOUNTER — OFFICE VISIT (OUTPATIENT)
Dept: PRIMARY CARE CLINIC | Age: 44
End: 2024-02-02

## 2024-02-02 VITALS
OXYGEN SATURATION: 98 % | WEIGHT: 132.6 LBS | RESPIRATION RATE: 20 BRPM | BODY MASS INDEX: 23.49 KG/M2 | DIASTOLIC BLOOD PRESSURE: 70 MMHG | SYSTOLIC BLOOD PRESSURE: 114 MMHG | TEMPERATURE: 98.5 F

## 2024-02-02 DIAGNOSIS — J98.01 POST-INFECTION BRONCHOSPASM: ICD-10-CM

## 2024-02-02 DIAGNOSIS — Z00.00 ENCOUNTER FOR WELL ADULT EXAM WITHOUT ABNORMAL FINDINGS: Primary | ICD-10-CM

## 2024-02-02 RX ORDER — METHYLPREDNISOLONE 4 MG/1
TABLET ORAL
Qty: 1 KIT | Refills: 0 | Status: SHIPPED | OUTPATIENT
Start: 2024-02-02 | End: 2024-02-08

## 2024-02-02 RX ORDER — METHYLPREDNISOLONE SODIUM SUCCINATE 125 MG/2ML
125 INJECTION, POWDER, LYOPHILIZED, FOR SOLUTION INTRAMUSCULAR; INTRAVENOUS ONCE
Status: COMPLETED | OUTPATIENT
Start: 2024-02-02 | End: 2024-02-02

## 2024-02-02 RX ADMIN — METHYLPREDNISOLONE SODIUM SUCCINATE 125 MG: 125 INJECTION, POWDER, LYOPHILIZED, FOR SOLUTION INTRAMUSCULAR; INTRAVENOUS at 10:38

## 2024-02-02 ASSESSMENT — ENCOUNTER SYMPTOMS
WHEEZING: 0
DIARRHEA: 0
VOMITING: 0
SORE THROAT: 0
NAUSEA: 0
CONSTIPATION: 0
SHORTNESS OF BREATH: 0
COUGH: 1
RHINORRHEA: 0
ABDOMINAL PAIN: 0

## 2024-02-02 ASSESSMENT — PATIENT HEALTH QUESTIONNAIRE - PHQ9
1. LITTLE INTEREST OR PLEASURE IN DOING THINGS: 0
SUM OF ALL RESPONSES TO PHQ9 QUESTIONS 1 & 2: 0
SUM OF ALL RESPONSES TO PHQ QUESTIONS 1-9: 0
2. FEELING DOWN, DEPRESSED OR HOPELESS: 0

## 2024-02-02 NOTE — PATIENT INSTRUCTIONS
SURVEY:     You may be receiving a survey from [employer] regarding your visit today.     Please complete the survey to enable us to provide the highest quality of care to you and your family.     If you cannot score us a very good on any question, please call the office to discuss how we could have made your experience a very good one.     Thank you,    Stevan Frey, APRN-CNP  Isa Padilla, APRN-CNP  Christiane, LPN  Dai, CMA  Ben, CMA  Marianne, CMA  Marianna, PCA  Manuely, PM         Well Visit, Ages 18 to 65: Care Instructions  Well visits can help you stay healthy. Your doctor has checked your overall health and may have suggested ways to take good care of yourself. Your doctor also may have recommended tests. You can help prevent illness with healthy eating, good sleep, vaccinations, regular exercise, and other steps.    Get the tests that you and your doctor decide on. Depending on your age and risks, examples might include screening for diabetes; hepatitis C; HIV; and cervical, breast, lung, and colon cancer. Screening helps find diseases before any symptoms appear.   Eat healthy foods. Choose fruits, vegetables, whole grains, lean protein, and low-fat dairy foods. Limit saturated fat and reduce salt.     Limit alcohol. Men should have no more than 2 drinks a day. Women should have no more than 1. For some people, no alcohol is the best choice.   Exercise. Get at least 30 minutes of exercise on most days of the week. Walking can be a good choice.     Reach and stay at your healthy weight. This will lower your risk for many health problems.   Take care of your mental health. Try to stay connected with friends, family, and community, and find ways to manage stress.     If you're feeling depressed or hopeless, talk to someone. A counselor can help. If you don't have a counselor, talk to your doctor.   Talk to your doctor if you think you may have a problem with alcohol or drug use. This includes prescription

## 2024-02-02 NOTE — PROGRESS NOTES
After obtaining consent, and per orders of Dr. Stevan Frey CNP, injection of Solu-Medrol 125 mg given in Right upper quad. gluteus by HESHAM AMEZCUA LPN. Patient instructed to remain in clinic for 20 minutes afterwards, and to report any adverse reaction to me immediately.

## 2024-02-02 NOTE — PROGRESS NOTES
Well Adult Note  Name: Jaymie Miranda Today’s Date: 2024   MRN: [id number] Sex: Female   Age: 43 y.o. Ethnicity:  /    : 1980 Race:  /       Jaymie Miranda is here for well adult exam.  History:    Overall she states she is feeling well.  She has suffered from several respiratory illnesses over the last few months and is complaining of residual cough.  This is worse at night.  She does not feel ill.  She has been exercising and eating well.  She is taking no prescription medications at this time.  Has been following with OB/GYN and is considering artificial insemination with a sperm donor to have another child.    Review of Systems   Constitutional:  Negative for chills, fatigue and fever.   HENT:  Negative for congestion, rhinorrhea and sore throat.    Eyes:  Negative for visual disturbance.   Respiratory:  Positive for cough. Negative for shortness of breath and wheezing.    Cardiovascular:  Negative for chest pain and palpitations.   Gastrointestinal:  Negative for abdominal pain, constipation, diarrhea, nausea and vomiting.   Genitourinary:  Negative for difficulty urinating and dysuria.   Musculoskeletal:  Negative for gait problem, neck pain and neck stiffness.   Skin:  Negative for rash.   Neurological:  Negative for dizziness, syncope, light-headedness and headaches.       No Known Allergies      Prior to Visit Medications    Medication Sig Taking? Authorizing Provider   methylPREDNISolone (MEDROL DOSEPACK) 4 MG tablet Take by mouth. Yes Stevan Frey APRN - CNP   Multiple Vitamin (MULTIVITAMIN ADULT PO) Take by mouth daily Yes ProviderJefferson MD   ascorbic acid (VITAMIN C) 500 MG tablet Take 1 tablet by mouth daily Yes ProviderJefferson MD   vitamin D3 (CHOLECALCIFEROL) 25 MCG (1000 UT) TABS tablet Take 1 tablet by mouth daily Yes Deysi Hu APRN - CNP         Past Medical History:   Diagnosis Date    Abnormal Pap smear of

## 2024-05-14 ENCOUNTER — OFFICE VISIT (OUTPATIENT)
Dept: PRIMARY CARE CLINIC | Age: 44
End: 2024-05-14
Payer: COMMERCIAL

## 2024-05-14 VITALS
TEMPERATURE: 98.5 F | DIASTOLIC BLOOD PRESSURE: 70 MMHG | WEIGHT: 133.7 LBS | RESPIRATION RATE: 20 BRPM | SYSTOLIC BLOOD PRESSURE: 118 MMHG | BODY MASS INDEX: 23.68 KG/M2 | HEART RATE: 110 BPM | OXYGEN SATURATION: 97 %

## 2024-05-14 DIAGNOSIS — J20.9 ACUTE BRONCHITIS, UNSPECIFIED ORGANISM: Primary | ICD-10-CM

## 2024-05-14 PROCEDURE — G8420 CALC BMI NORM PARAMETERS: HCPCS | Performed by: NURSE PRACTITIONER

## 2024-05-14 PROCEDURE — G8427 DOCREV CUR MEDS BY ELIG CLIN: HCPCS | Performed by: NURSE PRACTITIONER

## 2024-05-14 PROCEDURE — 99213 OFFICE O/P EST LOW 20 MIN: CPT | Performed by: NURSE PRACTITIONER

## 2024-05-14 PROCEDURE — 1036F TOBACCO NON-USER: CPT | Performed by: NURSE PRACTITIONER

## 2024-05-14 RX ORDER — CEFDINIR 300 MG/1
300 CAPSULE ORAL 2 TIMES DAILY
COMMUNITY
Start: 2024-05-14

## 2024-05-14 RX ORDER — METHYLPREDNISOLONE 4 MG/1
TABLET ORAL
Qty: 1 KIT | Refills: 0 | Status: SHIPPED | OUTPATIENT
Start: 2024-05-14 | End: 2024-05-20

## 2024-05-14 RX ORDER — BENZONATATE 200 MG/1
200 CAPSULE ORAL 3 TIMES DAILY PRN
Qty: 30 CAPSULE | Refills: 0 | Status: SHIPPED | OUTPATIENT
Start: 2024-05-14 | End: 2024-05-24

## 2024-05-14 RX ORDER — ALBUTEROL SULFATE 90 UG/1
2 AEROSOL, METERED RESPIRATORY (INHALATION) EVERY 4 HOURS PRN
COMMUNITY
Start: 2024-05-14

## 2024-05-14 RX ORDER — ALBUTEROL SULFATE 2.5 MG/3ML
SOLUTION RESPIRATORY (INHALATION)
COMMUNITY
Start: 2024-05-14

## 2024-05-14 ASSESSMENT — ENCOUNTER SYMPTOMS
ABDOMINAL PAIN: 0
HEARTBURN: 0
VOMITING: 0
HEMOPTYSIS: 0
COUGH: 1
TROUBLE SWALLOWING: 0
WHEEZING: 0
SINUS PAIN: 0
SHORTNESS OF BREATH: 0
NAUSEA: 0
SINUS PRESSURE: 0
RHINORRHEA: 1
DIARRHEA: 0

## 2024-05-14 ASSESSMENT — PATIENT HEALTH QUESTIONNAIRE - PHQ9
SUM OF ALL RESPONSES TO PHQ QUESTIONS 1-9: 0
1. LITTLE INTEREST OR PLEASURE IN DOING THINGS: NOT AT ALL
SUM OF ALL RESPONSES TO PHQ9 QUESTIONS 1 & 2: 0
2. FEELING DOWN, DEPRESSED OR HOPELESS: NOT AT ALL
SUM OF ALL RESPONSES TO PHQ QUESTIONS 1-9: 0

## 2024-05-14 NOTE — PROGRESS NOTES
Name: Jaymie Miranda  : 1980         Chief Complaint:     Chief Complaint   Patient presents with    Cough     X 3 days,worse at night       History of Present Illness:      Jaymie Miranda is a 43 y.o.  female who presents with Cough (X 3 days,worse at night)      Jaymie is here today for an acute care office visit.    Seen in urgent care earlier this morning, prescription for cefdinir and albuterol given.  States she was asked to follow-up here for her cough.    Cough  This is a new problem. The current episode started in the past 7 days. The problem has been waxing and waning. Episode frequency: FREQUENTLY. The cough is Productive of sputum. Associated symptoms include headaches, nasal congestion, postnasal drip, rhinorrhea and a sore throat. Pertinent negatives include no chest pain, chills, ear congestion, ear pain, fever, heartburn, hemoptysis, myalgias, rash, shortness of breath, sweats, weight loss or wheezing. The symptoms are aggravated by lying down. She has tried a beta-agonist inhaler and rest for the symptoms. The treatment provided moderate relief. Her past medical history is significant for bronchitis. There is no history of asthma, bronchiectasis, COPD, emphysema, environmental allergies or pneumonia.         Past Medical History:     Past Medical History:   Diagnosis Date    Abnormal Pap smear of cervix     ASCUS - repeat pap normal    Anemia     Hypoglycemia       Reviewed all health maintenance requirements and ordered appropriate tests  There are no preventive care reminders to display for this patient.    Past Surgical History:     Past Surgical History:   Procedure Laterality Date    BREAST ENHANCEMENT SURGERY Bilateral     COSMETIC SURGERY  2014    Implants in my chest    DILATION AND CURETTAGE OF UTERUS  2020    DILATATION AND CURETTAGE SUCTION- Dr. Rodriguez     DILATION AND CURETTAGE OF UTERUS N/A 2020    DILATATION AND CURETTAGE SUCTION performed by

## 2024-05-14 NOTE — PATIENT INSTRUCTIONS
SURVEY:     You may be receiving a survey from [facility] [employer] regarding your visit today.     Please complete the survey to enable us to provide the highest quality of care to you and your family.     If you cannot score us a very good on any question, please call the office to discuss how we could have made your experience a very good one.     Thank you,    Stevan Frey, APRN-CNP  Isa Padilla, APRN-CNP  Christiane, LPN  Dai, CMA  Ben, CMA  Marianne, CMA  Marianna, PCA  Jessica, CMA  Chrissie, PM

## 2024-07-01 ENCOUNTER — OFFICE VISIT (OUTPATIENT)
Dept: OBGYN | Age: 44
End: 2024-07-01
Payer: COMMERCIAL

## 2024-07-01 VITALS
HEIGHT: 63 IN | WEIGHT: 134 LBS | SYSTOLIC BLOOD PRESSURE: 102 MMHG | BODY MASS INDEX: 23.74 KG/M2 | DIASTOLIC BLOOD PRESSURE: 62 MMHG

## 2024-07-01 DIAGNOSIS — Z01.419 WELL WOMAN EXAM WITH ROUTINE GYNECOLOGICAL EXAM: Primary | ICD-10-CM

## 2024-07-01 DIAGNOSIS — Z12.31 ENCOUNTER FOR SCREENING MAMMOGRAM FOR MALIGNANT NEOPLASM OF BREAST: ICD-10-CM

## 2024-07-01 PROCEDURE — 99396 PREV VISIT EST AGE 40-64: CPT | Performed by: ADVANCED PRACTICE MIDWIFE

## 2024-07-01 NOTE — PROGRESS NOTES
today.    Please complete the survey to enable us to provide the highest quality of care to you and your family.    We strive for all 5's - thank you    If you cannot score us a very good (5) on any question, please call the office to discuss this with Marialuisa (our ). We would like to discuss how we could of made your experience a very good one.    Marialuisa: [phone]    Thank you.     BONNIE Redmond - ROGER,7/1/2024 11:13 AM

## 2024-07-01 NOTE — PATIENT INSTRUCTIONS
SURVEY:    You may be receiving a survey regarding your visit today.    Please complete the survey to enable us to provide the highest quality of care to you and your family.    We strive for all 5's - thank you    If you cannot score us a very good (5) on any question, please call the office to discuss this with Marialuisa (our ). We would like to discuss how we could of made your experience a very good one.    Marialuisa: [phone]    Thank you.

## 2024-07-30 ENCOUNTER — HOSPITAL ENCOUNTER (OUTPATIENT)
Dept: WOMENS IMAGING | Age: 44
Discharge: HOME | End: 2024-08-01
Payer: COMMERCIAL

## 2024-07-30 VITALS — BODY MASS INDEX: 23.57 KG/M2 | WEIGHT: 133 LBS | HEIGHT: 63 IN

## 2024-07-30 DIAGNOSIS — Z12.31 ENCOUNTER FOR SCREENING MAMMOGRAM FOR MALIGNANT NEOPLASM OF BREAST: ICD-10-CM

## 2024-07-30 PROCEDURE — 77063 BREAST TOMOSYNTHESIS BI: CPT

## 2024-12-12 ENCOUNTER — OFFICE VISIT (OUTPATIENT)
Dept: UROLOGY | Age: 44
End: 2024-12-12
Payer: COMMERCIAL

## 2024-12-12 VITALS
HEART RATE: 75 BPM | TEMPERATURE: 98.2 F | WEIGHT: 136 LBS | DIASTOLIC BLOOD PRESSURE: 66 MMHG | SYSTOLIC BLOOD PRESSURE: 100 MMHG | BODY MASS INDEX: 24.09 KG/M2

## 2024-12-12 DIAGNOSIS — N39.46 MIXED INCONTINENCE: Primary | ICD-10-CM

## 2024-12-12 PROCEDURE — 99213 OFFICE O/P EST LOW 20 MIN: CPT | Performed by: NURSE PRACTITIONER

## 2024-12-12 PROCEDURE — G8484 FLU IMMUNIZE NO ADMIN: HCPCS | Performed by: NURSE PRACTITIONER

## 2024-12-12 PROCEDURE — 1036F TOBACCO NON-USER: CPT | Performed by: NURSE PRACTITIONER

## 2024-12-12 PROCEDURE — 51798 US URINE CAPACITY MEASURE: CPT | Performed by: NURSE PRACTITIONER

## 2024-12-12 PROCEDURE — G8427 DOCREV CUR MEDS BY ELIG CLIN: HCPCS | Performed by: NURSE PRACTITIONER

## 2024-12-12 PROCEDURE — G8420 CALC BMI NORM PARAMETERS: HCPCS | Performed by: NURSE PRACTITIONER

## 2024-12-12 NOTE — PROGRESS NOTES
History  4/2022 Referral from TADEO NICOLE for incontinence.  She does experience frequency every hour during the day.  She has nocturia x1.  She does complain of both urge and stress incontinence.  She does not wear pads, but does need to change her underwear more than once a day due to the leaking.  She does feel that her stress incontinence is worse than urge incontinence.  PVR 0 mL.  She has 4 children, her youngest is 3 months old.  She has had these urinary symptoms for a number of years.  She noticed significant worsening of incontinence apx 3 years ago.  She is not breast-feeding.  She has 2-3 small, soft bowel movements daily.  She consumes 32 ounces or more of tea daily.  She denies any history of frequent urinary tract infections or stones.                US showed normal anatomy. KUB showed constipation                 Started miralax daily                 Pelvic: no trigger point pain, strength 2/5 2/2023 Completed 11 sessions of PFR. No longer having CHRISTOPHER. Frequency improved from every 1 hour to every 2-3 hours. No longer wearing pads    Today  Here today to follow-up for incontinence.  She denies frequency, urgency or nocturia.  She has very rare stress incontinence.  She does not wear pads regularly.  She denies any dysuria or gross hematuria.  PVR is low.    Plan  Continue pelvic floor exercises at least daily    Follow-up in 1 year or sooner if needed

## 2025-02-03 SDOH — ECONOMIC STABILITY: FOOD INSECURITY: WITHIN THE PAST 12 MONTHS, YOU WORRIED THAT YOUR FOOD WOULD RUN OUT BEFORE YOU GOT MONEY TO BUY MORE.: NEVER TRUE

## 2025-02-03 SDOH — ECONOMIC STABILITY: TRANSPORTATION INSECURITY
IN THE PAST 12 MONTHS, HAS LACK OF TRANSPORTATION KEPT YOU FROM MEETINGS, WORK, OR FROM GETTING THINGS NEEDED FOR DAILY LIVING?: NO

## 2025-02-03 SDOH — ECONOMIC STABILITY: INCOME INSECURITY: IN THE LAST 12 MONTHS, WAS THERE A TIME WHEN YOU WERE NOT ABLE TO PAY THE MORTGAGE OR RENT ON TIME?: NO

## 2025-02-03 SDOH — ECONOMIC STABILITY: TRANSPORTATION INSECURITY
IN THE PAST 12 MONTHS, HAS THE LACK OF TRANSPORTATION KEPT YOU FROM MEDICAL APPOINTMENTS OR FROM GETTING MEDICATIONS?: NO

## 2025-02-03 SDOH — ECONOMIC STABILITY: FOOD INSECURITY: WITHIN THE PAST 12 MONTHS, THE FOOD YOU BOUGHT JUST DIDN'T LAST AND YOU DIDN'T HAVE MONEY TO GET MORE.: NEVER TRUE

## 2025-02-03 ASSESSMENT — PATIENT HEALTH QUESTIONNAIRE - PHQ9
2. FEELING DOWN, DEPRESSED OR HOPELESS: SEVERAL DAYS
SUM OF ALL RESPONSES TO PHQ QUESTIONS 1-9: 2
SUM OF ALL RESPONSES TO PHQ QUESTIONS 1-9: 2
2. FEELING DOWN, DEPRESSED OR HOPELESS: SEVERAL DAYS
SUM OF ALL RESPONSES TO PHQ9 QUESTIONS 1 & 2: 2
SUM OF ALL RESPONSES TO PHQ QUESTIONS 1-9: 2
SUM OF ALL RESPONSES TO PHQ9 QUESTIONS 1 & 2: 2
SUM OF ALL RESPONSES TO PHQ QUESTIONS 1-9: 2
1. LITTLE INTEREST OR PLEASURE IN DOING THINGS: SEVERAL DAYS
1. LITTLE INTEREST OR PLEASURE IN DOING THINGS: SEVERAL DAYS

## 2025-02-06 ENCOUNTER — OFFICE VISIT (OUTPATIENT)
Dept: PRIMARY CARE CLINIC | Age: 45
End: 2025-02-06
Payer: COMMERCIAL

## 2025-02-06 VITALS
SYSTOLIC BLOOD PRESSURE: 120 MMHG | DIASTOLIC BLOOD PRESSURE: 64 MMHG | TEMPERATURE: 98.2 F | OXYGEN SATURATION: 98 % | WEIGHT: 134.3 LBS | HEART RATE: 78 BPM | BODY MASS INDEX: 23.79 KG/M2

## 2025-02-06 DIAGNOSIS — Z00.00 ENCOUNTER FOR WELL ADULT EXAM WITHOUT ABNORMAL FINDINGS: Primary | ICD-10-CM

## 2025-02-06 PROCEDURE — 99396 PREV VISIT EST AGE 40-64: CPT | Performed by: NURSE PRACTITIONER

## 2025-02-06 ASSESSMENT — ENCOUNTER SYMPTOMS
NAUSEA: 0
COUGH: 0
VOMITING: 0
RHINORRHEA: 0
SHORTNESS OF BREATH: 0
CONSTIPATION: 0
WHEEZING: 0
ABDOMINAL PAIN: 0
DIARRHEA: 0
SORE THROAT: 0

## 2025-02-06 NOTE — PATIENT INSTRUCTIONS
SURVEY:     You may be receiving a survey from [employer] regarding your visit today.     Please complete the survey to enable us to provide the highest quality of care to you and your family.     If you cannot score us a very good on any question, please call the office to discuss how we could have made your experience a very good one.     Thank you,    Stevan Frey, APRN-CNP  Isa Padilla, APRN-CNP  Christiane, LPN  Dai, CMA  Ben, CMA  Marianne, CMA  Marianna, PCA  Jessica, CMA  Chrissie, PM        Well Visit, Ages 18 to 65: Care Instructions  Well visits can help you stay healthy. Your doctor has checked your overall health and may have suggested ways to take good care of yourself. Your doctor also may have recommended tests. You can help prevent illness with healthy eating, good sleep, vaccinations, regular exercise, and other steps.    Get the tests that you and your doctor decide on. Depending on your age and risks, examples might include screening for diabetes; hepatitis C; HIV; and cervical, breast, lung, and colon cancer. Screening helps find diseases before any symptoms appear.   Eat healthy foods. Choose fruits, vegetables, whole grains, lean protein, and low-fat dairy foods. Limit saturated fat and reduce salt.     Limit alcohol. Men should have no more than 2 drinks a day. Women should have no more than 1. For some people, no alcohol is the best choice.   Exercise. Get at least 30 minutes of exercise on most days of the week. Walking can be a good choice.     Reach and stay at your healthy weight. This will lower your risk for many health problems.   Take care of your mental health. Try to stay connected with friends, family, and community, and find ways to manage stress.     If you're feeling depressed or hopeless, talk to someone. A counselor can help. If you don't have a counselor, talk to your doctor.   Talk to your doctor if you think you may have a problem with alcohol or drug use. This includes

## 2025-02-06 NOTE — PROGRESS NOTES
Well Adult Note  Name: Jaymie Miranda Today’s Date: 2025   MRN: [id number] Sex: Female   Age: 44 y.o. Ethnicity:  /    : 1980 Race:  /       Jaymie Miranda is here for a well adult exam.       Assessment & Plan   Encounter for well adult exam without abnormal findings  -     Basic Metabolic Panel; Future  -     Lipid Panel; Future  -     CBC with Auto Differential; Future        Return in 1 year (on 2026) for CPE (Physical Exam).       Subjective   History:    Overall she states she is feeling very well.  She has no immediate medical issues or concerns for today's visit.  She is not currently taking any prescription medications.  She is taking over-the-counter supplements.  She states she has been exercising and eating well.  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  She is not working outside of the home currently.    Review of Systems   Constitutional:  Negative for chills, fatigue and fever.   HENT:  Negative for congestion, rhinorrhea and sore throat.    Eyes:  Negative for visual disturbance.   Respiratory:  Negative for cough, shortness of breath and wheezing.    Cardiovascular:  Negative for chest pain and palpitations.   Gastrointestinal:  Negative for abdominal pain, constipation, diarrhea, nausea and vomiting.   Genitourinary:  Negative for difficulty urinating and dysuria.   Musculoskeletal:  Negative for gait problem, neck pain and neck stiffness.   Skin:  Negative for rash.   Neurological:  Negative for dizziness, syncope, light-headedness and headaches.       No Known Allergies  Prior to Visit Medications    Medication Sig Taking? Authorizing Provider   albuterol sulfate HFA (PROVENTIL;VENTOLIN;PROAIR) 108 (90 Base) MCG/ACT inhaler 2 puffs every 4 hours as needed Yes Jefferson Case MD   Multiple Vitamin (MULTIVITAMIN ADULT PO) Take by mouth daily Yes Jefferson Case MD   ascorbic acid (VITAMIN C) 500

## 2025-02-24 ENCOUNTER — TELEPHONE (OUTPATIENT)
Dept: PRIMARY CARE CLINIC | Age: 45
End: 2025-02-24

## 2025-02-24 NOTE — TELEPHONE ENCOUNTER
Jaymie came to office to ask that hormone testing labs be added to the current labs ordered.  She would like to know if her hormones are \"in check\"

## 2025-03-07 ENCOUNTER — HOSPITAL ENCOUNTER (OUTPATIENT)
Age: 45
Discharge: HOME | End: 2025-03-07
Payer: COMMERCIAL

## 2025-03-07 DIAGNOSIS — Z00.00 ENCOUNTER FOR WELL ADULT EXAM WITHOUT ABNORMAL FINDINGS: ICD-10-CM

## 2025-03-07 LAB
ANION GAP SERPL CALCULATED.3IONS-SCNC: 9 MMOL/L (ref 9–16)
BASOPHILS # BLD: 0.06 K/UL (ref 0–0.2)
BASOPHILS NFR BLD: 1 % (ref 0–2)
BUN SERPL-MCNC: 12 MG/DL (ref 6–20)
BUN/CREAT SERPL: 17 (ref 9–20)
CALCIUM SERPL-MCNC: 8.9 MG/DL (ref 8.6–10.4)
CHLORIDE SERPL-SCNC: 105 MMOL/L (ref 98–107)
CHOLEST SERPL-MCNC: 192 MG/DL (ref 0–199)
CHOLESTEROL/HDL RATIO: 2.5
CO2 SERPL-SCNC: 25 MMOL/L (ref 20–31)
CREAT SERPL-MCNC: 0.7 MG/DL (ref 0.5–0.9)
EOSINOPHIL # BLD: 0.34 K/UL (ref 0–0.44)
EOSINOPHILS RELATIVE PERCENT: 4 % (ref 1–4)
ERYTHROCYTE [DISTWIDTH] IN BLOOD BY AUTOMATED COUNT: 16.5 % (ref 11.8–14.4)
GFR, ESTIMATED: >90 ML/MIN/1.73M2
GLUCOSE SERPL-MCNC: 86 MG/DL (ref 74–99)
HCT VFR BLD AUTO: 36.4 % (ref 36.3–47.1)
HDLC SERPL-MCNC: 76 MG/DL
HGB BLD-MCNC: 11.5 G/DL (ref 11.9–15.1)
IMM GRANULOCYTES # BLD AUTO: <0.03 K/UL (ref 0–0.3)
IMM GRANULOCYTES NFR BLD: 0 %
LDLC SERPL CALC-MCNC: 96 MG/DL (ref 0–100)
LYMPHOCYTES NFR BLD: 2.3 K/UL (ref 1.1–3.7)
LYMPHOCYTES RELATIVE PERCENT: 28 % (ref 24–43)
MCH RBC QN AUTO: 26.5 PG (ref 25.2–33.5)
MCHC RBC AUTO-ENTMCNC: 31.6 G/DL (ref 28.4–34.8)
MCV RBC AUTO: 83.9 FL (ref 82.6–102.9)
MONOCYTES NFR BLD: 0.71 K/UL (ref 0.1–1.2)
MONOCYTES NFR BLD: 9 % (ref 3–12)
NEUTROPHILS NFR BLD: 58 % (ref 36–65)
NEUTS SEG NFR BLD: 4.81 K/UL (ref 1.5–8.1)
NRBC BLD-RTO: 0 PER 100 WBC
PLATELET # BLD AUTO: 287 K/UL (ref 138–453)
PMV BLD AUTO: 10.1 FL (ref 8.1–13.5)
POTASSIUM SERPL-SCNC: 4.1 MMOL/L (ref 3.7–5.3)
RBC # BLD AUTO: 4.34 M/UL (ref 3.95–5.11)
SODIUM SERPL-SCNC: 139 MMOL/L (ref 136–145)
TRIGL SERPL-MCNC: 100 MG/DL
VLDLC SERPL CALC-MCNC: 20 MG/DL (ref 1–30)
WBC OTHER # BLD: 8.2 K/UL (ref 3.5–11.3)

## 2025-03-07 PROCEDURE — 80061 LIPID PANEL: CPT

## 2025-03-07 PROCEDURE — 36415 COLL VENOUS BLD VENIPUNCTURE: CPT

## 2025-03-07 PROCEDURE — 80048 BASIC METABOLIC PNL TOTAL CA: CPT

## 2025-03-07 PROCEDURE — 85025 COMPLETE CBC W/AUTO DIFF WBC: CPT

## 2025-03-08 ENCOUNTER — RESULTS FOLLOW-UP (OUTPATIENT)
Dept: PRIMARY CARE CLINIC | Age: 45
End: 2025-03-08

## 2025-03-10 NOTE — TELEPHONE ENCOUNTER
----- Message from BONNIE Harper CNP sent at 3/8/2025 11:00 AM EST -----  Results are stable, please call patient and make them aware.

## 2025-03-26 ENCOUNTER — OFFICE VISIT (OUTPATIENT)
Dept: OBGYN | Age: 45
End: 2025-03-26
Payer: COMMERCIAL

## 2025-03-26 VITALS
BODY MASS INDEX: 23.74 KG/M2 | WEIGHT: 134 LBS | HEIGHT: 63 IN | DIASTOLIC BLOOD PRESSURE: 72 MMHG | SYSTOLIC BLOOD PRESSURE: 118 MMHG

## 2025-03-26 DIAGNOSIS — N92.1 MENORRHAGIA WITH IRREGULAR CYCLE: Primary | ICD-10-CM

## 2025-03-26 DIAGNOSIS — R45.89 MOODY: ICD-10-CM

## 2025-03-26 PROCEDURE — 1036F TOBACCO NON-USER: CPT | Performed by: ADVANCED PRACTICE MIDWIFE

## 2025-03-26 PROCEDURE — G8420 CALC BMI NORM PARAMETERS: HCPCS | Performed by: ADVANCED PRACTICE MIDWIFE

## 2025-03-26 PROCEDURE — G8427 DOCREV CUR MEDS BY ELIG CLIN: HCPCS | Performed by: ADVANCED PRACTICE MIDWIFE

## 2025-03-26 PROCEDURE — 99213 OFFICE O/P EST LOW 20 MIN: CPT | Performed by: ADVANCED PRACTICE MIDWIFE

## 2025-03-26 NOTE — PROGRESS NOTES
PROBLEM VISIT     Date of service: 3/26/2025    Jaymie Miranda  Is a 44 y.o. single, female    PT's PCP is: Stevan Frey, BONNIE - CNP     : 1980                                             Subjective:       Patient's last menstrual period was 2025 (exact date).     OB History    Para Term  AB Living   6 4 4  2 4   SAB IAB Ectopic Molar Multiple Live Births   2    0 4      # Outcome Date GA Lbr Edward/2nd Weight Sex Type Anes PTL Lv   6 Term 22 39w1d 03:21 / 00:06 3.566 kg (7 lb 13.8 oz) F Vag-Spont EPI N RUSSELL   5 SAB 2020 8w0d          4 SAB 20 6w0d          3 Term 18 40w3d 05::20 4.465 kg (9 lb 13.5 oz) M Vag-Spont EPI N RUSSELL   2 Term 11 40w0d  3.827 kg (8 lb 7 oz) F Vag-Spont None  RUSSELL   1 Term 02/01/10 41w0d  3.884 kg (8 lb 9 oz) F Vag-Spont None  RUSSELL        Social History     Tobacco Use   Smoking Status Never   Smokeless Tobacco Never        Social History     Substance and Sexual Activity   Alcohol Use No       Social History     Substance and Sexual Activity   Sexual Activity Not Currently    Partners: Male    Birth control/protection: Coitus interruptus       Allergies: Patient has no known allergies.    Chief Complaint   Patient presents with    Irregular Menses     Pt c/o heavy periods, pt wondering if her cortisol needs checked because she is having issues losing weight. Headaches, Pt does not want cycle control medications. Pt unsure if she wants surgical options.        Last Yearly date: 24    Last pap date and results: 23    Last HPV date and results: 20 neg     PE:  Vital Signs  Blood pressure 118/72, height 1.6 m (5' 3\"), weight 60.8 kg (134 lb), last menstrual period 2025, not currently breastfeeding.  Estimated body mass index is 23.74 kg/m² as calculated from the following:    Height as of this encounter: 1.6 m (5' 3\").    Weight as of this encounter: 60.8 kg (134 lb).    No data recorded      NURSE: CATALINA

## 2025-04-03 ENCOUNTER — HOSPITAL ENCOUNTER (OUTPATIENT)
Age: 45
Discharge: HOME | End: 2025-04-03
Payer: COMMERCIAL

## 2025-04-03 ENCOUNTER — RESULTS FOLLOW-UP (OUTPATIENT)
Dept: OBGYN | Age: 45
End: 2025-04-03

## 2025-04-03 DIAGNOSIS — N92.1 MENORRHAGIA WITH IRREGULAR CYCLE: ICD-10-CM

## 2025-04-03 DIAGNOSIS — R45.89 MOODY: ICD-10-CM

## 2025-04-03 LAB
CORTIS SERPL-MCNC: 14.2 UG/DL (ref 2.5–19.5)
CORTISOL COLLECTION INFO: 755
ESTRADIOL LEVEL: 231 PG/ML
FSH SERPL-ACNC: 3.1 MIU/ML
LH SERPL-ACNC: 9.8 MIU/ML (ref 1.7–8.6)
PROGEST SERPL-MCNC: 15 NG/ML
TSH SERPL DL<=0.05 MIU/L-ACNC: 2.36 UIU/ML (ref 0.27–4.2)

## 2025-04-03 PROCEDURE — 83002 ASSAY OF GONADOTROPIN (LH): CPT

## 2025-04-03 PROCEDURE — 82670 ASSAY OF TOTAL ESTRADIOL: CPT

## 2025-04-03 PROCEDURE — 83001 ASSAY OF GONADOTROPIN (FSH): CPT

## 2025-04-03 PROCEDURE — 82533 TOTAL CORTISOL: CPT

## 2025-04-03 PROCEDURE — 84144 ASSAY OF PROGESTERONE: CPT

## 2025-04-03 PROCEDURE — 36415 COLL VENOUS BLD VENIPUNCTURE: CPT

## 2025-04-03 PROCEDURE — 84443 ASSAY THYROID STIM HORMONE: CPT

## 2025-04-24 ENCOUNTER — OFFICE VISIT (OUTPATIENT)
Dept: PRIMARY CARE CLINIC | Age: 45
End: 2025-04-24
Payer: COMMERCIAL

## 2025-04-24 VITALS
BODY MASS INDEX: 24.06 KG/M2 | DIASTOLIC BLOOD PRESSURE: 80 MMHG | WEIGHT: 135.8 LBS | RESPIRATION RATE: 16 BRPM | HEART RATE: 83 BPM | TEMPERATURE: 97.9 F | OXYGEN SATURATION: 98 % | SYSTOLIC BLOOD PRESSURE: 108 MMHG

## 2025-04-24 DIAGNOSIS — J01.00 ACUTE NON-RECURRENT MAXILLARY SINUSITIS: Primary | ICD-10-CM

## 2025-04-24 DIAGNOSIS — J20.9 ACUTE BRONCHITIS, UNSPECIFIED ORGANISM: ICD-10-CM

## 2025-04-24 LAB
INFLUENZA A ANTIGEN, POC: NEGATIVE
INFLUENZA B ANTIGEN, POC: NEGATIVE
LOT NUMBER POC: NORMAL
SARS-COV-2 RNA POC - COV: NORMAL
VALID INTERNAL CONTROL, POC: PRESENT
VENDOR AND KIT NAME POC: NORMAL

## 2025-04-24 PROCEDURE — 99214 OFFICE O/P EST MOD 30 MIN: CPT | Performed by: NURSE PRACTITIONER

## 2025-04-24 PROCEDURE — G8420 CALC BMI NORM PARAMETERS: HCPCS | Performed by: NURSE PRACTITIONER

## 2025-04-24 PROCEDURE — 1036F TOBACCO NON-USER: CPT | Performed by: NURSE PRACTITIONER

## 2025-04-24 PROCEDURE — G8427 DOCREV CUR MEDS BY ELIG CLIN: HCPCS | Performed by: NURSE PRACTITIONER

## 2025-04-24 RX ORDER — BENZONATATE 200 MG/1
200 CAPSULE ORAL 3 TIMES DAILY PRN
Qty: 30 CAPSULE | Refills: 0 | Status: SHIPPED | OUTPATIENT
Start: 2025-04-24 | End: 2025-05-04

## 2025-04-24 RX ORDER — PREDNISONE 20 MG/1
40 TABLET ORAL
Qty: 10 TABLET | Refills: 0 | Status: SHIPPED | OUTPATIENT
Start: 2025-04-24 | End: 2025-04-29

## 2025-04-24 ASSESSMENT — ENCOUNTER SYMPTOMS
RHINORRHEA: 1
WHEEZING: 0
VOMITING: 0
SORE THROAT: 1
SWOLLEN GLANDS: 1
HEARTBURN: 0
SINUS COMPLAINT: 1
SHORTNESS OF BREATH: 0
HOARSE VOICE: 0
VOICE CHANGE: 0
SINUS PAIN: 1
ABDOMINAL PAIN: 0
HEMOPTYSIS: 0
COUGH: 1
TROUBLE SWALLOWING: 0
DIARRHEA: 0
SINUS PRESSURE: 1
NAUSEA: 0

## 2025-04-24 ASSESSMENT — PATIENT HEALTH QUESTIONNAIRE - PHQ9
SUM OF ALL RESPONSES TO PHQ QUESTIONS 1-9: 0
SUM OF ALL RESPONSES TO PHQ QUESTIONS 1-9: 0
1. LITTLE INTEREST OR PLEASURE IN DOING THINGS: NOT AT ALL
SUM OF ALL RESPONSES TO PHQ QUESTIONS 1-9: 0
SUM OF ALL RESPONSES TO PHQ QUESTIONS 1-9: 0
2. FEELING DOWN, DEPRESSED OR HOPELESS: NOT AT ALL

## 2025-04-24 NOTE — PATIENT INSTRUCTIONS
SURVEY:     You may be receiving a survey from [facility] [employer] regarding your visit today.     Please complete the survey to enable us to provide the highest quality of care to you and your family.     If you cannot score us a very good on any question, please call the office to discuss how we could have made your experience a very good one.     Thank you,    Stevan Frey, APRN-CNP  Isa Padilla, APRN-CNP  Christiane, LPN  Dai, CMA  Ben, CMA  Marianne, CMA  Marianna, PCA  Jessica, CMA  Chrissie, PM

## 2025-04-24 NOTE — PROGRESS NOTES
[facility] 31.6 03/07/2025 08:20 AM    RDW 16.5 03/07/2025 08:20 AM     03/07/2025 08:20 AM    MPV 10.1 03/07/2025 08:20 AM     Lab Results   Component Value Date/Time    TSH 2.36 04/03/2025 07:50 AM     Lab Results   Component Value Date/Time    CHOL 192 03/07/2025 08:20 AM    LDL 96 03/07/2025 08:20 AM    HDL 76 03/07/2025 08:20 AM       Assessment/Plan:      Diagnosis Orders   1. Acute non-recurrent maxillary sinusitis  POC COVID-19, FLU A/B    amoxicillin-clavulanate (AUGMENTIN) 875-125 MG per tablet    predniSONE (DELTASONE) 20 MG tablet      2. Acute bronchitis, unspecified organism  predniSONE (DELTASONE) 20 MG tablet    benzonatate (TESSALON) 200 MG capsule          1.  Jaymie received counseling on the following healthy behaviors: nutrition and medication adherence  2.  Patient given educational materials - see patient instructions  3.  Was a self-tracking handout given in paper form or via [employer]? No  If yes, see orders or list here.  4.  Discussed use, benefit, and side effects of prescribed medications.  Barriers to medication compliance addressed.  All patient questions answered.Pt voiced understanding.   5.  Reviewed prior labs and health maintenance  6.  Continue current medications, diet and exercise.    Completed Refills   Requested Prescriptions     Signed Prescriptions Disp Refills    amoxicillin-clavulanate (AUGMENTIN) 875-125 MG per tablet 20 tablet 0     Sig: Take 1 tablet by mouth 2 times daily for 10 days    predniSONE (DELTASONE) 20 MG tablet 10 tablet 0     Sig: Take 2 tablets by mouth daily (with breakfast) for 5 days    benzonatate (TESSALON) 200 MG capsule 30 capsule 0     Sig: Take 1 capsule by mouth 3 times daily as needed for Cough         Return if symptoms worsen or fail to improve.

## 2025-07-10 ENCOUNTER — OFFICE VISIT (OUTPATIENT)
Dept: OBGYN | Age: 45
End: 2025-07-10
Payer: COMMERCIAL

## 2025-07-10 VITALS
DIASTOLIC BLOOD PRESSURE: 66 MMHG | BODY MASS INDEX: 24.03 KG/M2 | SYSTOLIC BLOOD PRESSURE: 112 MMHG | WEIGHT: 135.6 LBS | HEIGHT: 63 IN

## 2025-07-10 DIAGNOSIS — Z12.31 ENCOUNTER FOR SCREENING MAMMOGRAM FOR MALIGNANT NEOPLASM OF BREAST: ICD-10-CM

## 2025-07-10 DIAGNOSIS — Z01.419 WELL WOMAN EXAM WITH ROUTINE GYNECOLOGICAL EXAM: Primary | ICD-10-CM

## 2025-07-10 PROCEDURE — 99396 PREV VISIT EST AGE 40-64: CPT | Performed by: ADVANCED PRACTICE MIDWIFE

## 2025-07-10 NOTE — PATIENT INSTRUCTIONS
Semaglutide for weight loss    SURVEY:    You may be receiving a survey regarding your visit today.    Please complete the survey to enable us to provide the highest quality of care to you and your family.    We strive for all 5's - thank you    If you cannot score us a very good (5) on any question, please call the office to discuss this with Marialuisa (our ). We would like to discuss how we could of made your experience a very good one.    Marialuisa: [phone]    Thank you.

## 2025-07-10 NOTE — PROGRESS NOTES
year (around 7/10/2026) for yearly, set up routine mammo - print AVS.    I am having Jaymie Miranda maintain her vitamin D3, ascorbic acid, Multiple Vitamin (MULTIVITAMIN ADULT PO), albuterol sulfate HFA, and Zinc Sulfate (ZINC 15 PO).    She was also counseled on her preventative health maintenance recommendations and follow-up.     Patient Instructions   Semaglutide for weight loss    SURVEY:    You may be receiving a survey regarding your visit today.    Please complete the survey to enable us to provide the highest quality of care to you and your family.    We strive for all 5's - thank you    If you cannot score us a very good (5) on any question, please call the office to discuss this with Marialuisa (our ). We would like to discuss how we could of made your experience a very good one.    Marialuisa: [phone]    Thank you.     Elaine Fernandez, BONNIE - ROGER,7/10/2025 9:10 AM

## 2025-07-15 ENCOUNTER — OFFICE VISIT (OUTPATIENT)
Dept: OBGYN | Age: 45
End: 2025-07-15
Payer: COMMERCIAL

## 2025-07-15 ENCOUNTER — HOSPITAL ENCOUNTER (OUTPATIENT)
Age: 45
Setting detail: SPECIMEN
Discharge: HOME | End: 2025-07-15
Payer: COMMERCIAL

## 2025-07-15 VITALS
BODY MASS INDEX: 24.24 KG/M2 | HEIGHT: 63 IN | DIASTOLIC BLOOD PRESSURE: 64 MMHG | WEIGHT: 136.8 LBS | SYSTOLIC BLOOD PRESSURE: 114 MMHG

## 2025-07-15 DIAGNOSIS — N89.8 VAGINAL ODOR: ICD-10-CM

## 2025-07-15 DIAGNOSIS — N89.8 VAGINAL ODOR: Primary | ICD-10-CM
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
## 2025-07-15 DIAGNOSIS — T19.2XXA RETAINED TAMPON, INITIAL ENCOUNTER: ICD-10-CM

## 2025-07-15 PROCEDURE — 1036F TOBACCO NON-USER: CPT | Performed by: ADVANCED PRACTICE MIDWIFE

## 2025-07-15 PROCEDURE — 87660 TRICHOMONAS VAGIN DIR PROBE: CPT

## 2025-07-15 PROCEDURE — G8420 CALC BMI NORM PARAMETERS: HCPCS | Performed by: ADVANCED PRACTICE MIDWIFE

## 2025-07-15 PROCEDURE — 87510 GARDNER VAG DNA DIR PROBE: CPT

## 2025-07-15 PROCEDURE — G8427 DOCREV CUR MEDS BY ELIG CLIN: HCPCS | Performed by: ADVANCED PRACTICE MIDWIFE

## 2025-07-15 PROCEDURE — 87070 CULTURE OTHR SPECIMN AEROBIC: CPT

## 2025-07-15 PROCEDURE — 99213 OFFICE O/P EST LOW 20 MIN: CPT | Performed by: ADVANCED PRACTICE MIDWIFE

## 2025-07-15 PROCEDURE — 87480 CANDIDA DNA DIR PROBE: CPT

## 2025-07-15 PROCEDURE — 86403 PARTICLE AGGLUT ANTBDY SCRN: CPT

## 2025-07-15 NOTE — PROGRESS NOTES
ago.         PE:  Vital Signs  Blood pressure 114/64, height 1.6 m (5' 3\"), weight 62.1 kg (136 lb 12.8 oz), last menstrual period 07/02/2025, not currently breastfeeding.  Estimated body mass index is 24.23 kg/m² as calculated from the following:    Height as of this encounter: 1.6 m (5' 3\").    Weight as of this encounter: 62.1 kg (136 lb 12.8 oz).    No data recorded    NURSE:Yvonne IBARRA    HPI: here for vaginal odor     PT denies fever, chills, nausea and vomiting       Objective   No acute distress  Excellent communications  Well-nourished      Pelvic Exam: GENITAL/URINARY:  External Genitalia:  General appearance; normal, Hair distribution; normal, Lesions absent  Urethral Meatus:  Size normal, Location normal, Lesions absent, Prolapse absent  Urethra:  Fullness absent, Masses absent  Bladder:  Fullness absent, Masses absent, Tenderness absent, Cystocele absent  Vagina:  General appearance normal, Estrogen effect normal, Lesions absent, Pelvic support normal, copious white adherent discharge on vaginal walls, copious malodorous tan discharge in posterior vaginal vault, along with a retained tampon that was grasped with ring forceps and removed entirely  Cervix:  General appearance normal, Lesions absent, Discharge absent, Tenderness absent, Enlargement absent, Nodularity absent  Uterus:  Size normal, Tenderness absent  Adenexa:  Masses absent, Tenderness absent  Anus/Perineum:  Lesions absent and Masses absent                                                      Results reviewed today:    No results found for this visit on 07/15/25.    Assessment and Plan         Assessment & Plan  Vaginal odor   New, not at goal (unstable), continue current plan pending work up below    Orders:    Culture, Genital (with Gram Stain); Future    Vaginitis DNA Probe; Future    Retained tampon, initial encounter   removed                 I am having Jaymie Miranda maintain her vitamin D3, ascorbic acid, Multiple Vitamin

## 2025-07-16 LAB
CANDIDA SPECIES: POSITIVE
GARDNERELLA VAGINALIS: POSITIVE
SOURCE: ABNORMAL
TRICHOMONAS: NEGATIVE

## 2025-07-16 RX ORDER — FLUCONAZOLE 150 MG/1
150 TABLET ORAL ONCE
Qty: 1 TABLET | Refills: 0 | Status: SHIPPED | OUTPATIENT
Start: 2025-07-16 | End: 2025-07-16

## 2025-07-16 RX ORDER — METRONIDAZOLE 500 MG/1
500 TABLET ORAL 2 TIMES DAILY
Qty: 14 TABLET | Refills: 0 | Status: SHIPPED | OUTPATIENT
Start: 2025-07-16 | End: 2025-07-23

## 2025-07-19 LAB
MICROORGANISM SPEC CULT: ABNORMAL
SERVICE CMNT-IMP: ABNORMAL
SPECIMEN DESCRIPTION: ABNORMAL